# Patient Record
Sex: FEMALE | Race: OTHER
[De-identification: names, ages, dates, MRNs, and addresses within clinical notes are randomized per-mention and may not be internally consistent; named-entity substitution may affect disease eponyms.]

---

## 2018-03-30 ENCOUNTER — APPOINTMENT (OUTPATIENT)
Dept: NEPHROLOGY | Facility: CLINIC | Age: 78
End: 2018-03-30

## 2018-03-30 PROBLEM — Z00.00 ENCOUNTER FOR PREVENTIVE HEALTH EXAMINATION: Status: ACTIVE | Noted: 2018-03-30

## 2018-04-06 ENCOUNTER — APPOINTMENT (OUTPATIENT)
Dept: NEPHROLOGY | Facility: CLINIC | Age: 78
End: 2018-04-06
Payer: MEDICARE

## 2018-04-06 VITALS
BODY MASS INDEX: 24.15 KG/M2 | HEART RATE: 54 BPM | SYSTOLIC BLOOD PRESSURE: 201 MMHG | HEIGHT: 60 IN | WEIGHT: 123 LBS | OXYGEN SATURATION: 97 % | DIASTOLIC BLOOD PRESSURE: 73 MMHG

## 2018-04-06 VITALS — SYSTOLIC BLOOD PRESSURE: 200 MMHG | DIASTOLIC BLOOD PRESSURE: 75 MMHG

## 2018-04-06 VITALS — DIASTOLIC BLOOD PRESSURE: 80 MMHG | SYSTOLIC BLOOD PRESSURE: 195 MMHG

## 2018-04-06 DIAGNOSIS — Z78.9 OTHER SPECIFIED HEALTH STATUS: ICD-10-CM

## 2018-04-06 DIAGNOSIS — Z83.3 FAMILY HISTORY OF DIABETES MELLITUS: ICD-10-CM

## 2018-04-06 PROCEDURE — 99204 OFFICE O/P NEW MOD 45 MIN: CPT

## 2018-04-06 RX ORDER — AMLODIPINE BESYLATE 10 MG/1
10 TABLET ORAL DAILY
Qty: 90 | Refills: 3 | Status: ACTIVE | COMMUNITY
Start: 2018-04-06

## 2018-04-06 RX ORDER — PIOGLITAZONE HYDROCHLORIDE 30 MG/1
30 TABLET ORAL DAILY
Qty: 30 | Refills: 0 | Status: ACTIVE | COMMUNITY
Start: 2018-04-06

## 2018-04-09 LAB
24R-OH-CALCIDIOL SERPL-MCNC: 6.6 PG/ML
25(OH)D3 SERPL-MCNC: 17.9 NG/ML
ALBUMIN MFR SERPL ELPH: 42 %
ALBUMIN SERPL ELPH-MCNC: 3.8 G/DL
ALBUMIN SERPL-MCNC: 3.4 G/DL
ALBUMIN/GLOB SERPL: 0.7 RATIO
ALDOSTERONE SERUM: 18.5 NG/DL
ALPHA1 GLOB MFR SERPL ELPH: 4.3 %
ALPHA1 GLOB SERPL ELPH-MCNC: 0.4 G/DL
ALPHA2 GLOB MFR SERPL ELPH: 12.1 %
ALPHA2 GLOB SERPL ELPH-MCNC: 1 G/DL
ANA SER IF-ACNC: NEGATIVE
ANION GAP SERPL CALC-SCNC: 13 MMOL/L
APPEARANCE: CLEAR
B-GLOBULIN MFR SERPL ELPH: 12.5 %
B-GLOBULIN SERPL ELPH-MCNC: 1 G/DL
BACTERIA: NEGATIVE
BILIRUBIN URINE: NEGATIVE
BLOOD URINE: NEGATIVE
BUN SERPL-MCNC: 65 MG/DL
CALCIUM SERPL-MCNC: 9.1 MG/DL
CALCIUM SERPL-MCNC: 9.1 MG/DL
CHLORIDE SERPL-SCNC: 95 MMOL/L
CO2 SERPL-SCNC: 26 MMOL/L
COLOR: YELLOW
CREAT SERPL-MCNC: 4.63 MG/DL
CREAT SPEC-SCNC: 23 MG/DL
CREAT/PROT UR: 8.6 RATIO
DSDNA AB SER-ACNC: <12 IU/ML
FERRITIN SERPL-MCNC: 291 NG/ML
GAMMA GLOB FLD ELPH-MCNC: 2.4 G/DL
GAMMA GLOB MFR SERPL ELPH: 29.1 %
GLUCOSE QUALITATIVE U: NEGATIVE MG/DL
GLUCOSE SERPL-MCNC: 122 MG/DL
HBV CORE IGG+IGM SER QL: NONREACTIVE
HBV CORE IGM SER QL: NONREACTIVE
HBV DNA # SERPL NAA+PROBE: NOT DETECTED
HBV SURFACE AB SER QL: NONREACTIVE
HBV SURFACE AB SERPL IA-ACNC: <3 MIU/ML
HBV SURFACE AG SER QL: NONREACTIVE
HCV AB SER QL: NONREACTIVE
HCV S/CO RATIO: 0.51 S/CO
HEPB DNA PCR LOG: NOT DETECTED LOGIU/ML
HIV1+2 AB SPEC QL IA.RAPID: NONREACTIVE
HYALINE CASTS: 0 /LPF
INTERPRETATION SERPL IEP-IMP: NORMAL
IRON SATN MFR SERPL: 23 %
IRON SERPL-MCNC: 53 UG/DL
KETONES URINE: NEGATIVE
LEUKOCYTE ESTERASE URINE: NEGATIVE
M PROTEIN SPEC IFE-MCNC: NORMAL
MICROSCOPIC-UA: NORMAL
NITRITE URINE: NEGATIVE
PARATHYROID HORMONE INTACT: 132 PG/ML
PH URINE: 7
PHOSPHATE SERPL-MCNC: 5.4 MG/DL
POTASSIUM SERPL-SCNC: 4.4 MMOL/L
PROT SERPL-MCNC: 8.2 G/DL
PROT SERPL-MCNC: 8.2 G/DL
PROT UR-MCNC: 197 MG/DL
PROTEIN URINE: 300 MG/DL
RED BLOOD CELLS URINE: 1 /HPF
SODIUM SERPL-SCNC: 134 MMOL/L
SPECIFIC GRAVITY URINE: 1.01
SQUAMOUS EPITHELIAL CELLS: 0 /HPF
T PALLIDUM AB SER QL IA: NEGATIVE
TIBC SERPL-MCNC: 228 UG/DL
UIBC SERPL-MCNC: 175 UG/DL
URATE SERPL-MCNC: 6.8 MG/DL
URATE UR-MCNC: 8.1 MG/DL
UROBILINOGEN URINE: NEGATIVE MG/DL
WHITE BLOOD CELLS URINE: 0 /HPF

## 2018-04-10 LAB
C3 SERPL-MCNC: 156 MG/DL
C4 SERPL-MCNC: 29 MG/DL
CREAT ?TM UR-MCNC: 29.7 MG/DL
DEPRECATED KAPPA LC FREE/LAMBDA SER: 1.37 RATIO
DOPAMINE UR-MCNC: <30 PG/ML
DOPAMINE/CREAT UR: 41 MCG/G CR
EPINEPH UR-MCNC: <15 PG/ML
EPINEPH/CREAT UR: NORMAL
EPINEPHRINE/NOEPINEPHRINE CALC TOTAL RAN: 8 MCG/G CR
KAPPA LC CSF-MCNC: 17.2 MG/DL
KAPPA LC SERPL-MCNC: 23.6 MG/DL
NOREPINEPH UR-MCNC: 524 PG/ML
NOREPINEPH/CREAT UR: 8 MCG/G CR

## 2018-04-11 LAB
METANEPHRINE, PL: <10 PG/ML
METANEPHS UR-SCNC: NORMAL
NORMETANEPHRINE, PL: 40 PG/ML
RENIN ACTIVITY, PLASMA: <0.167 NG/ML/HR
VMA/CREAT UR: 3.3 MG/G CR

## 2018-04-13 LAB
CORTICOSTEROID BIND GLOBULIN: 2.5 MG/DL
CORTIS SERPL-MCNC: 15 UG/DL
CORTISOL, FREE: 1.4 UG/DL
PFCX: 9.2 %

## 2018-04-18 ENCOUNTER — FORM ENCOUNTER (OUTPATIENT)
Age: 78
End: 2018-04-18

## 2018-04-19 ENCOUNTER — APPOINTMENT (OUTPATIENT)
Dept: ULTRASOUND IMAGING | Facility: HOSPITAL | Age: 78
End: 2018-04-19

## 2018-04-19 ENCOUNTER — OUTPATIENT (OUTPATIENT)
Dept: OUTPATIENT SERVICES | Facility: HOSPITAL | Age: 78
LOS: 1 days | End: 2018-04-19
Payer: MEDICARE

## 2018-04-19 PROCEDURE — 93975 VASCULAR STUDY: CPT

## 2018-04-19 PROCEDURE — 76770 US EXAM ABDO BACK WALL COMP: CPT

## 2018-04-19 PROCEDURE — 93976 VASCULAR STUDY: CPT | Mod: 26

## 2018-04-19 PROCEDURE — 76770 US EXAM ABDO BACK WALL COMP: CPT | Mod: 26,59

## 2018-04-20 ENCOUNTER — APPOINTMENT (OUTPATIENT)
Dept: NEPHROLOGY | Facility: CLINIC | Age: 78
End: 2018-04-20
Payer: MEDICARE

## 2018-04-20 VITALS — RESPIRATION RATE: 12 BRPM | DIASTOLIC BLOOD PRESSURE: 70 MMHG | HEART RATE: 63 BPM | SYSTOLIC BLOOD PRESSURE: 190 MMHG

## 2018-04-20 PROCEDURE — 99214 OFFICE O/P EST MOD 30 MIN: CPT

## 2018-04-20 RX ORDER — IRBESARTAN AND HYDROCHLOROTHIAZIDE 300; 12.5 MG/1; MG/1
300-12.5 TABLET ORAL DAILY
Qty: 30 | Refills: 0 | Status: DISCONTINUED | COMMUNITY
Start: 2018-04-06 | End: 2018-04-20

## 2018-04-20 RX ORDER — NEBIVOLOL HYDROCHLORIDE 5 MG/1
5 TABLET ORAL DAILY
Qty: 30 | Refills: 0 | Status: DISCONTINUED | COMMUNITY
Start: 2018-04-06 | End: 2018-04-20

## 2018-04-20 RX ORDER — GLIPIZIDE 10 MG/1
10 TABLET ORAL
Qty: 180 | Refills: 0 | Status: DISCONTINUED | COMMUNITY
Start: 2017-10-20

## 2018-04-20 RX ORDER — ATORVASTATIN CALCIUM 20 MG/1
20 TABLET, FILM COATED ORAL
Qty: 90 | Refills: 0 | Status: DISCONTINUED | COMMUNITY
Start: 2017-12-11

## 2018-04-20 RX ORDER — OSELTAMIVIR PHOSPHATE 75 MG/1
75 CAPSULE ORAL
Qty: 10 | Refills: 0 | Status: DISCONTINUED | COMMUNITY
Start: 2018-01-08

## 2018-04-20 RX ORDER — METHYLPREDNISOLONE 4 MG/1
4 TABLET ORAL
Qty: 12 | Refills: 0 | Status: DISCONTINUED | COMMUNITY
Start: 2018-01-23

## 2018-04-20 RX ORDER — LABETALOL HYDROCHLORIDE 200 MG/1
200 TABLET, FILM COATED ORAL TWICE DAILY
Qty: 60 | Refills: 6 | Status: DISCONTINUED | COMMUNITY
Start: 2018-04-06 | End: 2018-04-20

## 2018-04-20 RX ORDER — ONDANSETRON 4 MG/1
4 TABLET, ORALLY DISINTEGRATING ORAL
Qty: 10 | Refills: 0 | Status: DISCONTINUED | COMMUNITY
Start: 2018-01-08

## 2018-04-20 RX ORDER — LOSARTAN POTASSIUM AND HYDROCHLOROTHIAZIDE 25; 100 MG/1; MG/1
100-25 TABLET ORAL
Qty: 90 | Refills: 0 | Status: DISCONTINUED | COMMUNITY
Start: 2017-12-11

## 2018-04-20 RX ORDER — ISOSORBIDE MONONITRATE 30 MG/1
30 TABLET, EXTENDED RELEASE ORAL
Qty: 90 | Refills: 0 | Status: DISCONTINUED | COMMUNITY
Start: 2018-01-23

## 2018-04-20 RX ORDER — METFORMIN HYDROCHLORIDE 1000 MG/1
1000 TABLET, COATED ORAL
Qty: 180 | Refills: 0 | Status: DISCONTINUED | COMMUNITY
Start: 2017-10-20

## 2018-04-20 RX ORDER — AMLODIPINE BESYLATE 5 MG/1
5 TABLET ORAL
Qty: 30 | Refills: 0 | Status: DISCONTINUED | COMMUNITY
Start: 2018-03-22

## 2018-04-20 RX ORDER — FUROSEMIDE 40 MG/1
40 TABLET ORAL
Qty: 30 | Refills: 6 | Status: ACTIVE | COMMUNITY
Start: 2018-04-20 | End: 1900-01-01

## 2018-05-04 ENCOUNTER — APPOINTMENT (OUTPATIENT)
Dept: NEPHROLOGY | Facility: CLINIC | Age: 78
End: 2018-05-04
Payer: MEDICARE

## 2018-05-04 VITALS — HEART RATE: 89 BPM | OXYGEN SATURATION: 97 % | DIASTOLIC BLOOD PRESSURE: 84 MMHG | SYSTOLIC BLOOD PRESSURE: 203 MMHG

## 2018-05-04 VITALS — DIASTOLIC BLOOD PRESSURE: 82 MMHG | SYSTOLIC BLOOD PRESSURE: 200 MMHG

## 2018-05-04 LAB
BASOPHILS # BLD AUTO: 0.05 K/UL
BASOPHILS NFR BLD AUTO: 0.6 %
EOSINOPHIL # BLD AUTO: 0.16 K/UL
EOSINOPHIL NFR BLD AUTO: 1.9 %
HCT VFR BLD CALC: 30.8 %
HGB BLD-MCNC: 10.1 G/DL
IMM GRANULOCYTES NFR BLD AUTO: 0.1 %
LYMPHOCYTES # BLD AUTO: 1.97 K/UL
LYMPHOCYTES NFR BLD AUTO: 23.1 %
MAN DIFF?: NORMAL
MCHC RBC-ENTMCNC: 29.6 PG
MCHC RBC-ENTMCNC: 32.8 GM/DL
MCV RBC AUTO: 90.3 FL
MONOCYTES # BLD AUTO: 0.54 K/UL
MONOCYTES NFR BLD AUTO: 6.3 %
NEUTROPHILS # BLD AUTO: 5.79 K/UL
NEUTROPHILS NFR BLD AUTO: 68 %
PLATELET # BLD AUTO: 330 K/UL
RBC # BLD: 3.41 M/UL
RBC # FLD: 12.3 %
WBC # FLD AUTO: 8.52 K/UL

## 2018-05-04 PROCEDURE — 99214 OFFICE O/P EST MOD 30 MIN: CPT

## 2018-05-06 LAB
ALBUMIN SERPL ELPH-MCNC: 4.1 G/DL
ANION GAP SERPL CALC-SCNC: 22 MMOL/L
BUN SERPL-MCNC: 50 MG/DL
CALCIUM SERPL-MCNC: 9.6 MG/DL
CHLORIDE SERPL-SCNC: 92 MMOL/L
CO2 SERPL-SCNC: 21 MMOL/L
CREAT SERPL-MCNC: 5.36 MG/DL
GLUCOSE SERPL-MCNC: 162 MG/DL
PHOSPHATE SERPL-MCNC: 5.1 MG/DL
POTASSIUM SERPL-SCNC: 5.5 MMOL/L
SODIUM SERPL-SCNC: 135 MMOL/L

## 2018-05-07 RX ORDER — CLONIDINE HYDROCHLORIDE 0.1 MG/1
0.1 TABLET ORAL TWICE DAILY
Qty: 60 | Refills: 0 | Status: DISCONTINUED | COMMUNITY
Start: 2018-04-20 | End: 2018-05-07

## 2018-05-17 ENCOUNTER — APPOINTMENT (OUTPATIENT)
Dept: NEPHROLOGY | Facility: CLINIC | Age: 78
End: 2018-05-17
Payer: MEDICARE

## 2018-05-17 VITALS — DIASTOLIC BLOOD PRESSURE: 78 MMHG | RESPIRATION RATE: 12 BRPM | SYSTOLIC BLOOD PRESSURE: 175 MMHG | HEART RATE: 72 BPM

## 2018-05-17 PROCEDURE — 99214 OFFICE O/P EST MOD 30 MIN: CPT

## 2018-05-17 RX ORDER — HYDRALAZINE HYDROCHLORIDE 25 MG/1
25 TABLET ORAL 3 TIMES DAILY
Qty: 540 | Refills: 3 | Status: DISCONTINUED | COMMUNITY
Start: 2018-04-06 | End: 2018-05-17

## 2018-05-17 RX ORDER — HYDRALAZINE HYDROCHLORIDE 100 MG/1
100 TABLET ORAL 3 TIMES DAILY
Qty: 90 | Refills: 6 | Status: ACTIVE | COMMUNITY
Start: 2018-05-17 | End: 1900-01-01

## 2018-05-31 ENCOUNTER — APPOINTMENT (OUTPATIENT)
Dept: NEPHROLOGY | Facility: CLINIC | Age: 78
End: 2018-05-31
Payer: MEDICARE

## 2018-05-31 VITALS — RESPIRATION RATE: 14 BRPM | DIASTOLIC BLOOD PRESSURE: 75 MMHG | SYSTOLIC BLOOD PRESSURE: 190 MMHG | HEART RATE: 82 BPM

## 2018-05-31 DIAGNOSIS — E87.5 HYPERKALEMIA: ICD-10-CM

## 2018-05-31 DIAGNOSIS — R06.09 OTHER FORMS OF DYSPNEA: ICD-10-CM

## 2018-05-31 DIAGNOSIS — I10 ESSENTIAL (PRIMARY) HYPERTENSION: ICD-10-CM

## 2018-05-31 DIAGNOSIS — E55.9 VITAMIN D DEFICIENCY, UNSPECIFIED: ICD-10-CM

## 2018-05-31 DIAGNOSIS — D63.1 CHRONIC KIDNEY DISEASE, STAGE 5: ICD-10-CM

## 2018-05-31 DIAGNOSIS — N18.5 CHRONIC KIDNEY DISEASE, STAGE 5: ICD-10-CM

## 2018-05-31 DIAGNOSIS — E87.1 HYPO-OSMOLALITY AND HYPONATREMIA: ICD-10-CM

## 2018-05-31 DIAGNOSIS — N25.81 SECONDARY HYPERPARATHYROIDISM OF RENAL ORIGIN: ICD-10-CM

## 2018-05-31 DIAGNOSIS — E11.9 TYPE 2 DIABETES MELLITUS W/OUT COMPLICATIONS: ICD-10-CM

## 2018-05-31 PROCEDURE — 99215 OFFICE O/P EST HI 40 MIN: CPT

## 2018-05-31 RX ORDER — ERYTHROPOIETIN 20000 [IU]/ML
20000 INJECTION, SOLUTION INTRAVENOUS; SUBCUTANEOUS
Qty: 1 | Refills: 3 | Status: ACTIVE | COMMUNITY
Start: 2018-05-31 | End: 1900-01-01

## 2018-05-31 RX ORDER — CARVEDILOL 12.5 MG/1
12.5 TABLET, FILM COATED ORAL TWICE DAILY
Qty: 180 | Refills: 3 | Status: ACTIVE | COMMUNITY
Start: 2018-05-04 | End: 1900-01-01

## 2018-05-31 RX ORDER — ERGOCALCIFEROL 1.25 MG/1
1.25 MG CAPSULE, LIQUID FILLED ORAL
Qty: 12 | Refills: 0 | Status: ACTIVE | COMMUNITY
Start: 2018-04-20 | End: 1900-01-01

## 2018-06-06 ENCOUNTER — APPOINTMENT (OUTPATIENT)
Dept: NEPHROLOGY | Facility: CLINIC | Age: 78
End: 2018-06-06

## 2018-06-19 ENCOUNTER — APPOINTMENT (OUTPATIENT)
Dept: NEPHROLOGY | Facility: CLINIC | Age: 78
End: 2018-06-19

## 2018-07-03 ENCOUNTER — APPOINTMENT (OUTPATIENT)
Dept: NEPHROLOGY | Facility: CLINIC | Age: 78
End: 2018-07-03

## 2018-07-09 ENCOUNTER — APPOINTMENT (OUTPATIENT)
Dept: NEPHROLOGY | Facility: CLINIC | Age: 78
End: 2018-07-09

## 2019-01-07 ENCOUNTER — HOSPITAL ENCOUNTER (OUTPATIENT)
Dept: HOSPITAL 74 - JER | Age: 79
Discharge: HOME | End: 2019-01-07
Attending: SURGERY
Payer: COMMERCIAL

## 2019-01-07 VITALS — DIASTOLIC BLOOD PRESSURE: 60 MMHG | HEART RATE: 66 BPM | SYSTOLIC BLOOD PRESSURE: 152 MMHG

## 2019-01-07 VITALS — BODY MASS INDEX: 23.4 KG/M2

## 2019-01-07 VITALS — TEMPERATURE: 97.9 F

## 2019-01-07 DIAGNOSIS — T82.898A: Primary | ICD-10-CM

## 2019-01-07 DIAGNOSIS — I12.0: ICD-10-CM

## 2019-01-07 DIAGNOSIS — Z99.2: ICD-10-CM

## 2019-01-07 DIAGNOSIS — N18.6: ICD-10-CM

## 2019-01-07 DIAGNOSIS — E13.22: ICD-10-CM

## 2019-01-07 LAB
ALBUMIN SERPL-MCNC: 3.5 G/DL (ref 3.4–5)
ALP SERPL-CCNC: 86 U/L (ref 45–117)
ALT SERPL-CCNC: 18 U/L (ref 13–61)
ANION GAP SERPL CALC-SCNC: 8 MMOL/L (ref 8–16)
AST SERPL-CCNC: 17 U/L (ref 15–37)
BASOPHILS # BLD: 1.1 % (ref 0–2)
BILIRUB SERPL-MCNC: 0.4 MG/DL (ref 0.2–1)
BUN SERPL-MCNC: 35 MG/DL (ref 7–18)
CALCIUM SERPL-MCNC: 8.5 MG/DL (ref 8.5–10.1)
CHLORIDE SERPL-SCNC: 102 MMOL/L (ref 98–107)
CO2 SERPL-SCNC: 25 MMOL/L (ref 21–32)
CREAT SERPL-MCNC: 5.3 MG/DL (ref 0.55–1.3)
DEPRECATED RDW RBC AUTO: 13.6 % (ref 11.6–15.6)
EOSINOPHIL # BLD: 3.5 % (ref 0–4.5)
GLUCOSE SERPL-MCNC: 101 MG/DL (ref 74–106)
HCT VFR BLD CALC: 32.7 % (ref 32.4–45.2)
HGB BLD-MCNC: 11.3 GM/DL (ref 10.7–15.3)
INR BLD: 1 (ref 0.83–1.09)
LYMPHOCYTES # BLD: 23 % (ref 8–40)
MCH RBC QN AUTO: 32.7 PG (ref 25.7–33.7)
MCHC RBC AUTO-ENTMCNC: 34.5 G/DL (ref 32–36)
MCV RBC: 94.9 FL (ref 80–96)
MONOCYTES # BLD AUTO: 7 % (ref 3.8–10.2)
NEUTROPHILS # BLD: 65.4 % (ref 42.8–82.8)
PLATELET # BLD AUTO: 295 K/MM3 (ref 134–434)
PMV BLD: 6.7 FL (ref 7.5–11.1)
POTASSIUM SERPLBLD-SCNC: 5.3 MMOL/L (ref 3.5–5.1)
PROT SERPL-MCNC: 8.8 G/DL (ref 6.4–8.2)
PT PNL PPP: 11.8 SEC (ref 9.7–13)
RBC # BLD AUTO: 3.44 M/MM3 (ref 3.6–5.2)
SODIUM SERPL-SCNC: 134 MMOL/L (ref 136–145)
WBC # BLD AUTO: 7.3 K/MM3 (ref 4–10)

## 2019-01-07 PROCEDURE — 05773ZZ DILATION OF RIGHT AXILLARY VEIN, PERCUTANEOUS APPROACH: ICD-10-PCS | Performed by: SURGERY

## 2019-01-07 NOTE — EKG
Test Reason : 

Blood Pressure : ***/*** mmHG

Vent. Rate : 059 BPM     Atrial Rate : 059 BPM

   P-R Int : 158 ms          QRS Dur : 138 ms

    QT Int : 440 ms       P-R-T Axes : 064 -71 034 degrees

   QTc Int : 435 ms

 

SINUS BRADYCARDIA

RIGHT BUNDLE BRANCH BLOCK

LEFT ANTERIOR FASCICULAR BLOCK

*** BIFASCICULAR BLOCK ***

ABNORMAL ECG

NO PREVIOUS ECGS AVAILABLE

Confirmed by GETACHEW DUFFY MD ([id number]) on 1/7/2019 2:46:21 PM

 

Referred By:             Confirmed By:GETACHEW DUFFY MD

## 2019-01-07 NOTE — OP
Operative Note





- Note:


Operative Date: 01/07/19


Pre-Operative Diagnosis: Malfunction of AV fistula


Operation: Venoplasty AV fistula


Findings: 





Patent radial-cephalic fistula.


Post-Operative Diagnosis: Same as Pre-op


Surgeon: Manas Garcia


Anesthesiologist/CRNA: Neelam Foley MD


Anesthesia: Fractional

## 2019-01-07 NOTE — PDOC
History of Present Illness





- General


History Source: Patient


Exam Limitations: No Limitations





- History of Present Illness


Initial Comments: 





01/07/19 10:50


The patient is a 78 year old female with a PMH of HTN, CKD on dialysis , CAD s/

p 2 stents sent in to the ER by Dr. Garcia for a malfunctioning fistula. 

Patient states she was sent in because the dialysis center on saturday 1/5/18 

was unable to use her fistula. Patient states that for the past month the 

dialysis center has been able to use a needle on the fistula and one on the 

port in the neck, but were unable to do that on saturday. Next dialysis is on 

tuesday. Patient took her htn meds today, but is unable to recall the name. 


 


The patient denies chest pain, shortness of breath, headache and dizziness.


Denies fever, chills, nausea, vomit, diarrhea and constipation.


Denies dysuria, frequency, urgency and hematuria.


 


Allergies: NKA


Past surgical history: None reported. 


Social history: No reported alcohol, drug or cigarette use.  








<Germania Dan - Last Filed: 01/07/19 10:54>





<Nesha Vick - Last Filed: 01/07/19 12:37>





- General


Chief Complaint: AV shunt bleeding


Stated Complaint: FISTULA ISSUES


Time Seen by Provider: 01/07/19 09:48





Past History





<Germania Dan - Last Filed: 01/07/19 10:54>





- Past Medical History


COPD: No


CHF: No


Diabetes: Yes


HTN: Yes


Other medical history: stent





- Immunization History


Immunization Up to Date: No





- Suicide/Smoking/Psychosocial Hx


Smoking History: Never smoked


Have you smoked in the past 12 months: No


Information on smoking cessation initiated: No


Hx Alcohol Use: No


Drug/Substance Use Hx: No





<Nesha Vick - Last Filed: 01/07/19 12:37>





- Past Medical History


Allergies/Adverse Reactions: 


 Allergies











Allergy/AdvReac Type Severity Reaction Status Date / Time


 


Insulins Allergy   Verified 01/07/19 10:41














**Review of Systems





- Review of Systems


Able to Perform ROS?: Yes


Comments:: 





01/07/19 10:50


ADULT ROS


GENERAL/CONSTITUTIONAL: No fever or chills. No weakness. (+) malfunctioning 

fistula


HEAD, EYES, EARS, NOSE AND THROAT: No change in vision. No ear pain or 

discharge. No sore throat.


CARDIOVASCULAR: No chest pain or shortness of breath.


RESPIRATORY: No cough, wheezing, or hemoptysis.


GASTROINTESTINAL: No nausea, vomiting, diarrhea or constipation.


GENITOURINARY: No dysuria, frequency, or change in urination.


MUSCULOSKELETAL: No joint or muscle swelling or pain. No neck or back pain.


SKIN: No rash


NEUROLOGIC: No headache, vertigo, loss of consciousness, or change in strength/

sensation.


ENDOCRINE: No increased thirst. No abnormal weight change.


HEMATOLOGIC/LYMPHATIC: No anemia, easy bleeding, or history of blood clots.


ALLERGIC/IMMUNOLOGIC: No hives or skin allergy.





 





<Germania Dan - Last Filed: 01/07/19 10:54>





*Physical Exam





- Vital Signs


 Last Vital Signs











Temp Pulse Resp BP Pulse Ox


 


 97.8 F   59 L  16   125/50 L  95 


 


 01/07/19 09:13  01/07/19 09:13  01/07/19 09:13  01/07/19 09:13  01/07/19 09:13














- Physical Exam


Comments: 





01/07/19 10:51


ADULT EXAM


GENERAL: Awake, alert, and fully oriented, in no acute distress


HEAD: No signs of trauma


EYES: PERRLA, EOMI, sclera anicteric, conjunctiva clear


ENT: Auricles normal inspection, hearing grossly normal, nares patent, 

oropharynx clear without exudates. Moist mucosa


NECK: Normal ROM, supple, no lymphadenopathy, JVD, or masses


LUNGS: Breath sounds equal, clear to auscultation bilaterally.  No wheezes, and 

no crackles


HEART: Regular rate and rhythm, normal S1 and S2, no murmurs, rubs or gallops


ABDOMEN: Soft, nontender, normoactive bowel sounds.  No guarding, no rebound.  

No masses


EXTREMITIES: Normal range of motion, no edema.  No clubbing or cyanosis. No 

cords, erythema, or tenderness. (+) palpable thrill over the AV fistula. 


NEUROLOGICAL: Cranial nerves II through XII grossly intact.  Normal speech, 

normal gait


SKIN: Warm, Dry, normal turgor, no rashes or lesions noted.





 





<Germania Dan - Last Filed: 01/07/19 10:54>





- Vital Signs


 Last Vital Signs











Temp Pulse Resp BP Pulse Ox


 


 97.8 F   59 L  16   125/50 L  95 


 


 01/07/19 09:13  01/07/19 09:13  01/07/19 09:13  01/07/19 09:13  01/07/19 09:13














<Nesha Vick - Last Filed: 01/07/19 12:37>





Moderate Sedation





- Procedure Monitoring


Vital Signs: 


Procedure Monitoring Vital Signs











Temperature  97.8 F   01/07/19 09:13


 


Pulse Rate  59 L  01/07/19 09:13


 


Respiratory Rate  16   01/07/19 09:13


 


Blood Pressure  125/50 L  01/07/19 09:13


 


O2 Sat by Pulse Oximetry (%)  95   01/07/19 09:13











<Germania Dan - Last Filed: 01/07/19 10:54>





- Procedure Monitoring


Vital Signs: 


Procedure Monitoring Vital Signs











Temperature  97.8 F   01/07/19 09:13


 


Pulse Rate  59 L  01/07/19 09:13


 


Respiratory Rate  16   01/07/19 09:13


 


Blood Pressure  125/50 L  01/07/19 09:13


 


O2 Sat by Pulse Oximetry (%)  95   01/07/19 09:13











<Nesha Vick - Last Filed: 01/07/19 12:37>





**Heart Score/ECG Review





- ECG Impressions


Comment:: 





EKG read 10:42- Sinus veronica 59 bpm, +RBBB, +LAFB. 








<Nesha Vick - Last Filed: 01/07/19 12:37>





ED Treatment Course





- LABORATORY


CBC & Chemistry Diagram: 


 01/07/19 10:40





 01/07/19 10:40





<Nesha Vick - Last Filed: 01/07/19 12:37>





*DC/Admit/Observation/Transfer





- Attestations


Scribe Attestion: 





01/07/19 10:51





Documentation prepared by Germania Dan, acting as medical scribe for Nesha Vick MD.





<Germania Dan - Last Filed: 01/07/19 10:54>





- Discharge Dispostion


Decision to Admit order: Yes





<Nesha Vick - Last Filed: 01/07/19 12:37>


Diagnosis at time of Disposition: 


Clotted renal dialysis AV graft


Qualifiers:


 Encounter type: initial encounter Qualified Code(s): T82.868A - Thrombosis due 

to vascular prosthetic devices, implants and grafts, initial encounter








- Discharge Dispostion


Condition at time of disposition: Stable





- Referrals


Referrals: 


ON STAFF,NOT [Primary Care Provider] - 





- Patient Instructions





- Post Discharge Activity

## 2019-01-07 NOTE — CONSULT
<CarolinjulianaDami - Last Filed: 01/07/19 12:09>





- Consultation


REQUESTING PROVIDER: 





CONSULT REQUEST: We have been asked to surgically evaluate this patient for AV 

fistula malfunction





PCP:





HISTORY OF PRESENT ILLNESS:


77yo F presents to the ED with complaints of AV fistula malfunction.  Pt was 

able to have HD on Saturday and finish session, but had a lot of pain with 

dialysis.  Pt denies any pain or swelling around the fistula.  





PMHx:  ESRD, HTN, CAD





 


 Allergies











Allergy/AdvReac Type Severity Reaction Status Date / Time


 


Insulins Allergy   Verified 01/07/19 10:41














PHYSICAL EXAM:


GENERAL: Awake, alert, and fully oriented, in no acute distress.


HEAD: Normal with no signs of trauma.


EYES: PERRL, sclera anicteric, conjunctiva clear.


NECK: Normal ROM, supple without lymphadenopathy, JVD, or masses.


HEART: Regular rate and rhythm. 


UPPER EXTREMITIES: 2+ pulses, warm, well-perfused. No cyanosis. Cap refill <2 

seconds. No peripheral edema. LUE AV fistula with good thrill, with surrounding 

echymosis.


NEUROLOGICAL: Normal speech, gait not observed.


PSYCH: Cooperative. Good eye contact. Appropriate mood and affect.


SKIN: Warm, dry, normal turgor, no rashes or lesions noted.





 Vital Signs











Temperature  97.8 F   01/07/19 09:13


 


Pulse Rate  59 L  01/07/19 09:13


 


Respiratory Rate  16   01/07/19 09:13


 


Blood Pressure  125/50 L  01/07/19 09:13


 


O2 Sat by Pulse Oximetry (%)  95   01/07/19 09:13








 Lab Results











WBC  7.3 K/mm3 (4.0-10.0)   01/07/19  10:40    


 


RBC  3.44 M/mm3 (3.60-5.2)  L  01/07/19  10:40    


 


Hgb  11.3 GM/dL (10.7-15.3)   01/07/19  10:40    


 


Hct  32.7 % (32.4-45.2)   01/07/19  10:40    


 


MCV  94.9 fl (80-96)   01/07/19  10:40    


 


MCHC  34.5 g/dl (32.0-36.0)   01/07/19  10:40    


 


RDW  13.6 % (11.6-15.6)   01/07/19  10:40    


 


Plt Count  295 K/MM3 (134-434)   01/07/19  10:40    


 


Sodium  134 mmol/L (136-145)  L  01/07/19  10:40    


 


Potassium  5.3 mmol/L (3.5-5.1)  H  01/07/19  10:40    


 


Chloride  102 mmol/L ()   01/07/19  10:40    


 


Carbon Dioxide  25 mmol/L (21-32)   01/07/19  10:40    


 


Anion Gap  8 MMOL/L (8-16)   01/07/19  10:40    


 


BUN  35 mg/dL (7-18)  H  01/07/19  10:40    


 


Creatinine  5.3 mg/dL (0.55-1.3)  H  01/07/19  10:40    


 


Random Glucose  101 mg/dL ()   01/07/19  10:40    


 


Calcium  8.5 mg/dL (8.5-10.1)   01/07/19  10:40    














Problem List





- Problems


(1) Dialysis AV fistula malfunction


Assessment/Plan: 


Plan


   -Will bring to OR this afternoon for thrombectomy/AV fistula revision


   -keep NPO


   


Code(s): T82.590A - [facility] COMPL OF SURGICALLY CREATED ARTERIOVENOUS FISTULA, 

INIT   





Visit type





- Case Type


Case Type: ED Admission





- Emergency


Emergency Visit: Yes


Care time: The patient presented to the Emergency Department on the above date 

and was hospitalized for further evaluation of their emergent condition.





- New patient


This patient is new to me today: Yes


Date on this admission: 01/07/19





<Manas Garcia - Last Filed: 01/07/19 14:43>





- Consultation


REQUESTING PROVIDER: 





CONSULT REQUEST: We have been asked to surgically evaluate this patient for (

specify).





PCP:





HISTORY OF PRESENT ILLNESS:





PMHx:          





PSHx:        


 Home Medications











 Medication  Instructions  Recorded


 


Unobtainable  01/07/19








 Allergies











Allergy/AdvReac Type Severity Reaction Status Date / Time


 


Insulins Allergy   Verified 01/07/19 10:41








REVIEW OF SYSTEMS:


CONSTITUTIONAL: 


Absent:  fever, chills, diaphoresis, generalized weakness, malaise, loss of 

appetite, weight change


CARDIOVASCULAR: 


Absent: chest pain, syncope, palpitations, irregular heart rate, lightheadedness

, peripheral edema


RESPIRATORY: 


Absent: cough, shortness of breath, dyspnea with exertion, wheezing, stridor, 

hemoptysis


GASTROINTESTINAL:


Absent: abdominal pain, abdominal distension, nausea, vomiting, diarrhea, 

constipation, melena, hematochezia


GENITOURINARY: 


Absent: dysuria, frequency, urgency, hesitancy, hematuria, flank pain, genital 

pain


MUSCULOSKELETAL: 


Absent: myalgia, arthralgia, joint swelling, back pain, neck pain


SKIN: 


Absent: rash, itching, pallor


HEMATOLOGIC/IMMUNOLOGIC: 


Absent: easy bleeding, easy bruising, lymphadenopathy


NEUROLOGIC: 


Absent: headache, focal weakness, paresthesias, dizziness, unsteady gait, 

seizure, mental status changes, 


bladder or bowel incontinence


PSYCHIATRIC: 


Absent: anxiety, depression, suicidal or homicidal ideation, hallucinations.





PHYSICAL EXAM:


GENERAL: Awake, alert, and fully oriented, in no acute distress.


HEAD: Normal with no signs of trauma.


EYES: PERRL, sclera anicteric, conjunctiva clear.


NECK: Normal ROM, supple without lymphadenopathy, JVD, or masses.


LUNGS: Clear to auscultation bilat anteriorly. No wheezes, and no crackles. No 

accessory muscle use.


HEART: Regular rate and rhythm. No murmurs


ABDOMEN: Soft, nontender, not distended, normoactive bowel sounds, no guarding, 

no rebound, no masses.  No organomegaly. 


MUSCULOSKELETAL: Normal ROM at all joints. No bony deformities or tenderness. 

No CVA tenderness.


UPPER EXTREMITIES: 2+ pulses, warm, well-perfused. No cyanosis. Cap refill <2 

seconds. No peripheral edema.


LOWER EXTREMITIES: 2+ pulses, warm, well-perfused. No calf tenderness. No 

peripheral edema. 


NEUROLOGICAL: Normal speech, gait not observed.


PSYCH: Cooperative. Good eye contact. Appropriate mood and affect.


SKIN: Warm, dry, normal turgor, no rashes or lesions noted.


 Vital Signs











Temperature  97.9 F   01/07/19 12:00


 


Pulse Rate  68   01/07/19 12:00


 


Respiratory Rate  16   01/07/19 12:00


 


Blood Pressure  111/56 L  01/07/19 12:00


 


O2 Sat by Pulse Oximetry (%)  18 L  01/07/19 12:00








 Lab Results











WBC  7.3 K/mm3 (4.0-10.0)   01/07/19  10:40    


 


RBC  3.44 M/mm3 (3.60-5.2)  L  01/07/19  10:40    


 


Hgb  11.3 GM/dL (10.7-15.3)   01/07/19  10:40    


 


Hct  32.7 % (32.4-45.2)   01/07/19  10:40    


 


MCV  94.9 fl (80-96)   01/07/19  10:40    


 


MCHC  34.5 g/dl (32.0-36.0)   01/07/19  10:40    


 


RDW  13.6 % (11.6-15.6)   01/07/19  10:40    


 


Plt Count  295 K/MM3 (134-434)   01/07/19  10:40    


 


Sodium  134 mmol/L (136-145)  L  01/07/19  10:40    


 


Potassium  5.3 mmol/L (3.5-5.1)  H  01/07/19  10:40    


 


Chloride  102 mmol/L ()   01/07/19  10:40    


 


Carbon Dioxide  25 mmol/L (21-32)   01/07/19  10:40    


 


Anion Gap  8 MMOL/L (8-16)   01/07/19  10:40    


 


BUN  35 mg/dL (7-18)  H  01/07/19  10:40    


 


Creatinine  5.3 mg/dL (0.55-1.3)  H  01/07/19  10:40    


 


Random Glucose  101 mg/dL ()   01/07/19  10:40    


 


Calcium  8.5 mg/dL (8.5-10.1)   01/07/19  10:40    


 


Blood Type  O POSITIVE   01/07/19  11:30    


 


Antibody Screen  Negative   01/07/19  11:30    


 


INR  1.00  (0.83-1.09)   01/07/19  11:30    








I was called Saturday that her AV fistula was not able to be used for dialysis 

access and that it was clotted. Her Permacath was used.





Today there is a good thrill and bruit in the fistula. Duplex shows no clot. 

The vein is 5.3 mm in diameter.





Venogram and venoplasty planned for "malfunction" of AV fistula.
